# Patient Record
(demographics unavailable — no encounter records)

---

## 2025-05-05 NOTE — PHYSICAL EXAM
[JVD] : no jugular venous distention  [Normal Breath Sounds] : Normal breath sounds [Normal Heart Sounds] : normal heart sounds [Calm] : calm [de-identified] : soft [de-identified] : L LE CVS

## 2025-06-18 NOTE — ASSESSMENT
[FreeTextEntry1] : 67 YR OLD  female s/p C3LIMA/ESHA 5/16/25. with Dr. Pena. Post op course complicated by leukocytosis with ID following no source found, PAF placed on Amio load, behavioral health consult for confusion + adjustment disorder DC to [facility], readmitted 5/26 from [facility] with complaints of SOB and chest pain, WBC 16 afebrile, moderate lt pl effusion on CT scan.  PMH includes CVA, seizures, HF and HTN with post op course  CXR with small left pleural effusion, continue diuresis and monitor with serial CXR. Hold off pigtail placement at this time. WBC down 13, UA negative, afebrile. Continue off antibiotics per ID. TTE: small pericardial effusion, no tamponade physiology on echo, episode of bradycardia with HR to 55, decreased BB to toprol 25 qd, Plavix resumed, no need for lt pleural effusion to be tapped continue diuresis dc to rehab

## 2025-06-25 NOTE — ASSESSMENT
POCT Urine Dip Results    Test Date:  09.22.22  Time:   03:32  Test Number: 0864  Test Color - Yellow  Test Clarity - Clear    GLU  Negative  KRISH  Negative  KET  Negative  SC  >=1.030  BLO  Negative  pH  6.0  PRO  Negative  URO  0.2 E.U./dL  NIT  Negative  PATRICK  Negative   [FreeTextEntry1] : 67 YR OLD  female s/p C3LIMA/ESHA 5/16/25. with Dr. Pena. Post op course complicated by leukocytosis with ID following no source found, PAF placed on Amio load, behavioral health consult for confusion + adjustment disorder DC to [facility], readmitted 5/26 from [facility] with complaints of SOB and chest pain, WBC 16 afebrile, moderate lt pl effusion on CT scan.  PMH includes CVA, seizures, HF and HTN with post op course  CXR with small left pleural effusion, continue diuresis and monitor with serial CXR. Hold off pigtail placement at this time. WBC down 13, UA negative, afebrile. Continue off antibiotics per ID. TTE: small pericardial effusion, no tamponade physiology on echo, episode of bradycardia with HR to 55, decreased BB to toprol 25 qd, Plavix resumed, no need for lt pleural effusion to be tapped continue diuresis dc to rehab  Today she presents and reports that she has pain to occasional MSI and LT SVG site   Today on exam bilateral lung fields are clear, no wheezing or rales, no use of accessory muscles noted or respiratory distress, normal sinus rhythm, sternum stable, incision clean, dry and intact. RT SVG site is clean, dry and intact.  No peripheral edema noted. Sutures removed today.    Instructed patient on importance of optimal glycemic control, daily showering, daily weights, any signs of fever (temperature greater than 101F, chills,  incentive spirometer use, and increase ambulation as tolerated. Instructed to call office with any signs or symptoms of infection or weight gain of 2 or more pounds in 1 day or 3 or more pounds in 1 week.    Discussed intake of plant based foods, including vegetables, fruits, and whole grain foods: legumes, nuts and seeds, fish or seafood, lean meats, and non-fat or low-fat diary foods. Plant based oils (non-tropical) in place of solid fats. Instructed patient to limit intake of high fat meats and processed meats, high-fat diary foods, dietary cholesterol and sodium, foods and beverages with added sugars.   Plan: 1) Continue current medication regimen 2) Follow up with cardiologist ( Dr. Kyrie Steinberg July 2 2025 ) and PCP 3) Follow up in 2 weeks  4) Ambulate as tolerated  5)  Weigh daily  6) Continue to increase activity and walk daily as tolerated. Continue to use incentive spirometer. 7) Keep legs elevated above heart when resting/sitting/sleeping. 8) Call MD if you experience fever, fatigue, dizziness, confusion, syncope, shortness of breath, chest pain not relieved with analgesics, increased redness/drainage from the surgical  incision site

## 2025-06-25 NOTE — PHYSICAL EXAM
[] : no respiratory distress [Respiration, Rhythm And Depth] : normal respiratory rhythm and effort [Auscultation Breath Sounds / Voice Sounds] : lungs were clear to auscultation bilaterally [Apical Impulse] : the apical impulse was normal [Heart Rate And Rhythm] : heart rate was normal and rhythm regular [Heart Sounds] : normal S1 and S2 [Heart Sounds Gallop] : no gallops [Clean] : clean [Dry] : dry [Healing Well] : healing well [FreeTextEntry5] : RT SVG site  [FreeTextEntry3] : Trace pedal edema

## 2025-06-25 NOTE — ASSESSMENT
[FreeTextEntry1] : 67 YR OLD  female s/p C3LIMA/ESHA 5/16/25. with Dr. Pena. Post op course complicated by leukocytosis with ID following no source found, PAF placed on Amio load, behavioral health consult for confusion + adjustment disorder DC to [facility], readmitted 5/26 from [facility] with complaints of SOB and chest pain, WBC 16 afebrile, moderate lt pl effusion on CT scan.  PMH includes CVA, seizures, HF and HTN with post op course  CXR with small left pleural effusion, continue diuresis and monitor with serial CXR. Hold off pigtail placement at this time. WBC down 13, UA negative, afebrile. Continue off antibiotics per ID. TTE: small pericardial effusion, no tamponade physiology on echo, episode of bradycardia with HR to 55, decreased BB to toprol 25 qd, Plavix resumed, no need for lt pleural effusion to be tapped continue diuresis dc to rehab  Today she presents and reports that she has pain to occasional MSI and LT SVG site   Today on exam bilateral lung fields are clear, no wheezing or rales, no use of accessory muscles noted or respiratory distress, normal sinus rhythm, sternum stable, incision clean, dry and intact. RT SVG site is clean, dry and intact.  No peripheral edema noted. Sutures removed today.    Instructed patient on importance of optimal glycemic control, daily showering, daily weights, any signs of fever (temperature greater than 101F, chills,  incentive spirometer use, and increase ambulation as tolerated. Instructed to call office with any signs or symptoms of infection or weight gain of 2 or more pounds in 1 day or 3 or more pounds in 1 week.    Discussed intake of plant based foods, including vegetables, fruits, and whole grain foods: legumes, nuts and seeds, fish or seafood, lean meats, and non-fat or low-fat diary foods. Plant based oils (non-tropical) in place of solid fats. Instructed patient to limit intake of high fat meats and processed meats, high-fat diary foods, dietary cholesterol and sodium, foods and beverages with added sugars.   Plan: 1) Continue current medication regimen 2) Follow up with cardiologist ( Dr. Kyrie Steinberg July 2 2025 ) and PCP 3) Follow up in 2 weeks  4) Ambulate as tolerated  5)  Weigh daily  6) Continue to increase activity and walk daily as tolerated. Continue to use incentive spirometer. 7) Keep legs elevated above heart when resting/sitting/sleeping. 8) Call MD if you experience fever, fatigue, dizziness, confusion, syncope, shortness of breath, chest pain not relieved with analgesics, increased redness/drainage from the surgical  incision site

## 2025-06-25 NOTE — END OF VISIT
[FreeTextEntry3] :  I, BRENDA Packer , personally performed the evaluation and management (E/M) services for this established  patient. That E/M includes conducting the initial examination, assessing all conditions, and establishing the plan of care. Today, THANG HANSON  was here to observe my evaluation and management services for this patient.

## 2025-06-29 NOTE — ASSESSMENT
[FreeTextEntry1] : 1.HfrEF: continue on lasix, aldactone, metoprolol, f/u with Dr. Steinberg cardiology, all recent Hospital records reviewed.   2.CAD/NSTEMI: w/ PAFibb, continue on amidarone, asa, lipitor, plavix and f/u with CT sx and Cardiology.   3.DM-2: hba1c of 5.9 on 5/22/25, continue on metformin 500 mg TID, Pt advised to keep diabetic diet, decrease carbs and increase dietary protein intake. Exercise as tolerated 3-4 times a week.  4.Chronic pain with neuropathy: continue on Cymbalta, gabapentin, Percocet and f.u with .   5.Hypothyroidism: continue on cytomel 5 mcg daily and levothyroxine 25 mcg once daily.

## 2025-06-29 NOTE — HISTORY OF PRESENT ILLNESS
[FreeTextEntry1] : Patient comes in to establish care.  [de-identified] : ARTUR GASPAR is a 67 year F who comes in to [facility] care. Pt used to follow with Dr.Charles Alexander, PCP last seen about 6 mo ago.  Pt follows with Dr.William Jaramillo (wound care), Dr.Frank Pena (CT Sx), Dr.Alex Steinberg (cardiologist),  (pain management) and Optho .  Pt with hx of HfrEF, DM-2, Hypothyroidism, Chronic pain with neuropathy, HTN, CVA, PNA, Migraines, Seizure disorder, CAD with NSTEMI, PAF (on amiodarone).  Pt recently in [facility] 5/25-5/27/25 for sob with left pleural effusion, high troponin and leukocytosis s/p C31/ESHA on 5/16/25. Pt seen by ID for leukocytosis and ID found no source. Patient denies any cp, sob, abdominal pain, nausea, vomiting, palpitations, fever, chills, constipation, diarrhea.

## 2025-06-29 NOTE — PHYSICAL EXAM
[TextEntry] : General: NAD HEENT: NC/AT, EOMI, PERRLA. Neck: supple, no JVD. no LAD. CVS: S1, S2 normal, RRR, no m/g/r Resp: CTA b/l, no wheeze/rale/rhonchi Abdomen: soft, NT/ND, positive bowel sounds.  Extremities: no edema Neuro: aaox3

## 2025-06-29 NOTE — HEALTH RISK ASSESSMENT
[No] : In the past 12 months have you used drugs other than those required for medical reasons? No [Patient declined bone density test] : Patient declined bone density test [Patient declined colonoscopy] : Patient declined colonoscopy [Never] : Never [Patient reported mammogram was normal] : Patient reported mammogram was normal [Patient reported PAP Smear was normal] : Patient reported PAP Smear was normal [I have developed a follow-up plan documented below in the note.] : I have developed a follow-up plan documented below in the note. [Patient reported colonoscopy was normal] : Patient reported colonoscopy was normal [With Family] : lives with family [] :  [Audit-CScore] : 0 [EyeExamDate] : 2025 [MammogramDate] : 2023 [PapSmearDate] : 2020 [ColonoscopyDate] : 2020

## 2025-07-02 NOTE — HISTORY OF PRESENT ILLNESS
[FreeTextEntry1] : 67 [facility], [facility]I, transferred to , where LHC revealed severe 3-vessel disease, transferred to [facility], where underwent off-pump 3V CABG 5/16 by Becky.  She reports continued improvement with regards to edema, which by has resolved. Says her breathing feels normal. Has had no sensation of chest pressure or pain.

## 2025-07-02 NOTE — REASON FOR VISIT
[Cardiac Failure] : cardiac failure [Hypertension] : hypertension [Coronary Artery Disease] : coronary artery disease [Spouse] : spouse

## 2025-07-02 NOTE — DISCUSSION/SUMMARY
[With Me] : with me [___ Week(s)] : in [unfilled] week(s) [EKG obtained to assist in diagnosis and management of assessed problem(s)] : EKG obtained to assist in diagnosis and management of assessed problem(s) [FreeTextEntry1] : Had lengthy discussion with patient and spouse regarding test findings, implications and plan of care. All questions were answered.  Records of inpt care from  and [facility] were reviewed.

## 2025-07-02 NOTE — CARDIOLOGY SUMMARY
From: Aby Urias  To: José Lagos MD  Sent: 8/7/2020 7:24 PM CDT  Subject: Other    I need a prescription refill for OMEPRAZOLE DR 40 MG capsule,  my insurance covers an90 day supply and I only have 1 capsule for tomorrow.   [de-identified] : 5/16/25: CABG x 3 (LIMA-LAD | SVG-PDA | SVG-OM) LAAL with AtriClip 40mm and 50mm clips